# Patient Record
Sex: MALE | Race: WHITE | NOT HISPANIC OR LATINO | Employment: OTHER | URBAN - METROPOLITAN AREA
[De-identification: names, ages, dates, MRNs, and addresses within clinical notes are randomized per-mention and may not be internally consistent; named-entity substitution may affect disease eponyms.]

---

## 2023-11-02 ENCOUNTER — APPOINTMENT (OUTPATIENT)
Dept: OTOLARYNGOLOGY | Facility: CLINIC | Age: 53
End: 2023-11-02
Payer: MEDICARE

## 2024-02-14 ENCOUNTER — HOSPITAL ENCOUNTER (OUTPATIENT)
Dept: RADIOLOGY | Facility: HOSPITAL | Age: 54
Discharge: HOME | End: 2024-02-14
Payer: MEDICARE

## 2024-02-14 PROCEDURE — 93971 EXTREMITY STUDY: CPT

## 2024-03-08 ENCOUNTER — APPOINTMENT (OUTPATIENT)
Dept: RADIOLOGY | Facility: HOSPITAL | Age: 54
End: 2024-03-08
Payer: MEDICARE

## 2024-03-18 ENCOUNTER — APPOINTMENT (OUTPATIENT)
Dept: RADIOLOGY | Facility: HOSPITAL | Age: 54
End: 2024-03-18
Payer: MEDICARE

## 2024-05-03 ENCOUNTER — APPOINTMENT (OUTPATIENT)
Dept: RADIOLOGY | Facility: HOSPITAL | Age: 54
End: 2024-05-03
Payer: MEDICARE

## 2024-05-10 ENCOUNTER — APPOINTMENT (OUTPATIENT)
Dept: RADIOLOGY | Facility: HOSPITAL | Age: 54
End: 2024-05-10
Payer: MEDICARE

## 2024-05-10 ENCOUNTER — HOSPITAL ENCOUNTER (OUTPATIENT)
Dept: RADIOLOGY | Facility: HOSPITAL | Age: 54
Discharge: HOME | End: 2024-05-10
Payer: MEDICARE

## 2024-05-10 DIAGNOSIS — M25.512 PAIN IN LEFT SHOULDER: ICD-10-CM

## 2024-05-10 PROCEDURE — 73221 MRI JOINT UPR EXTREM W/O DYE: CPT | Mod: LT

## 2024-05-10 PROCEDURE — 73221 MRI JOINT UPR EXTREM W/O DYE: CPT | Mod: LEFT SIDE | Performed by: RADIOLOGY

## 2024-07-30 DIAGNOSIS — N48.6 PEYRONIE DISEASE: ICD-10-CM

## 2024-07-31 ENCOUNTER — CLINICAL SUPPORT (OUTPATIENT)
Dept: AUDIOLOGY | Facility: CLINIC | Age: 54
End: 2024-07-31
Payer: MEDICARE

## 2024-07-31 ENCOUNTER — SPECIALTY PHARMACY (OUTPATIENT)
Dept: PHARMACY | Facility: CLINIC | Age: 54
End: 2024-07-31

## 2024-07-31 DIAGNOSIS — H90.3 SENSORINEURAL HEARING LOSS (SNHL) OF BOTH EARS: Primary | ICD-10-CM

## 2024-07-31 PROCEDURE — HRANC PR HEARING AID NO CHARGE: Performed by: AUDIOLOGIST

## 2024-07-31 NOTE — PROGRESS NOTES
HEARING AID CHECK    RIGHT:PHONAK DARONEO  M 30 R #1 LENGTH M  AND MEDIUM VENTED DOME SN: [id number]  LEFT:PHONAK AUDEO  M 30 R #1 LENGTH M  AND MEDIUM VENTED DOME SN: [id number]  FIT DATE: 2021  WARRANTY: 10/10/2024    This patient was seen for a HAC with the complaint that  right aid not working.  Right  replaced but aid still dead.   It is under warranty and will be sent out.  Left aid fine today and did a quick clean and check before sending aid out for repair.       The following programming changes were made: NONE    The patient paid  NO charge  for today's visit.  Return when aid comes back from a no charge repair or sooner if needed.    APPOINTMENT TIME:  30 minutes

## 2024-08-09 ENCOUNTER — DOCUMENTATION (OUTPATIENT)
Dept: AUDIOLOGY | Facility: CLINIC | Age: 54
End: 2024-08-09
Payer: MEDICARE

## 2024-08-09 NOTE — PROGRESS NOTES
The patient's right hearing aid is back from repair.  Called the patient to pick it up in [place].  He will drop off his left aid to be sent in at that time.  N/C

## 2024-08-14 DIAGNOSIS — N52.9 ERECTILE DYSFUNCTION, UNSPECIFIED ERECTILE DYSFUNCTION TYPE: Primary | ICD-10-CM

## 2024-08-14 RX ORDER — ALPROSTADIL 20 UG/ML
20 INJECTION, POWDER, LYOPHILIZED, FOR SOLUTION INTRACAVERNOUS ONCE AS NEEDED
Qty: 20 MCG | Refills: 3 | Status: SHIPPED | OUTPATIENT
Start: 2024-08-14

## 2024-08-19 ENCOUNTER — DOCUMENTATION (OUTPATIENT)
Dept: AUDIOLOGY | Facility: CLINIC | Age: 54
End: 2024-08-19
Payer: MEDICARE

## 2024-08-19 NOTE — PROGRESS NOTES
Mr. Pereyra dropped off left hearing aid to be sent out for end of warranty repair. Hearing aid will be picked up in [place] when it returns from repair.

## 2024-08-29 ENCOUNTER — DOCUMENTATION (OUTPATIENT)
Dept: UROLOGY | Facility: CLINIC | Age: 54
End: 2024-08-29
Payer: MEDICARE

## 2024-08-29 NOTE — PROGRESS NOTES
He has a history of Peyronie's disease.  He has a 40 degree curvature.  He has a 1x2 cm palpable plaque dorsally.  He developed symptoms in 12/2023.  Xiaflex approval has been submitted.

## 2024-09-10 ENCOUNTER — APPOINTMENT (OUTPATIENT)
Dept: UROLOGY | Facility: CLINIC | Age: 54
End: 2024-09-10
Payer: MEDICARE

## 2024-09-11 DIAGNOSIS — N52.9 ERECTILE DYSFUNCTION, UNSPECIFIED ERECTILE DYSFUNCTION TYPE: ICD-10-CM

## 2024-09-11 RX ORDER — ALPROSTADIL 20 UG/ML
20 INJECTION, POWDER, LYOPHILIZED, FOR SOLUTION INTRACAVERNOUS ONCE AS NEEDED
Qty: 20 MCG | Refills: 1 | Status: SHIPPED | OUTPATIENT
Start: 2024-09-11

## 2024-09-12 ENCOUNTER — APPOINTMENT (OUTPATIENT)
Dept: UROLOGY | Facility: CLINIC | Age: 54
End: 2024-09-12
Payer: MEDICARE

## 2024-09-16 ENCOUNTER — APPOINTMENT (OUTPATIENT)
Dept: UROLOGY | Facility: CLINIC | Age: 54
End: 2024-09-16
Payer: MEDICARE

## 2024-10-01 ENCOUNTER — APPOINTMENT (OUTPATIENT)
Dept: UROLOGY | Facility: CLINIC | Age: 54
End: 2024-10-01
Payer: MEDICARE

## 2024-10-03 ENCOUNTER — APPOINTMENT (OUTPATIENT)
Dept: UROLOGY | Facility: CLINIC | Age: 54
End: 2024-10-03
Payer: MEDICARE

## 2024-10-07 ENCOUNTER — APPOINTMENT (OUTPATIENT)
Dept: UROLOGY | Facility: CLINIC | Age: 54
End: 2024-10-07
Payer: MEDICARE

## 2024-10-22 ENCOUNTER — APPOINTMENT (OUTPATIENT)
Dept: UROLOGY | Facility: CLINIC | Age: 54
End: 2024-10-22
Payer: MEDICARE

## 2024-10-22 DIAGNOSIS — N48.6 PEYRONIE'S DISEASE: ICD-10-CM

## 2024-10-22 RX ORDER — HALOPERIDOL 5 MG/1
5 TABLET ORAL 4 TIMES DAILY
COMMUNITY

## 2024-10-22 RX ORDER — BUPROPION HYDROCHLORIDE 300 MG/1
300 TABLET ORAL DAILY
COMMUNITY

## 2024-10-22 RX ORDER — FENOFIBRATE 160 MG/1
160 TABLET ORAL DAILY
COMMUNITY

## 2024-10-22 RX ORDER — CARBAMAZEPINE 200 MG/1
200 TABLET ORAL 3 TIMES DAILY
COMMUNITY

## 2024-10-22 RX ORDER — METOPROLOL SUCCINATE 25 MG/1
25 TABLET, EXTENDED RELEASE ORAL DAILY
COMMUNITY

## 2024-10-22 RX ORDER — PRAVASTATIN SODIUM 80 MG/1
80 TABLET ORAL DAILY
COMMUNITY

## 2024-10-22 RX ORDER — BENZTROPINE MESYLATE 2 MG/1
2 TABLET ORAL 2 TIMES DAILY
COMMUNITY

## 2024-10-22 ASSESSMENT — PAIN SCALES - GENERAL: PAINLEVEL_OUTOF10: 0-NO PAIN

## 2024-10-23 ENCOUNTER — TELEPHONE (OUTPATIENT)
Dept: UROLOGY | Facility: CLINIC | Age: 54
End: 2024-10-23
Payer: MEDICARE

## 2024-10-23 NOTE — TELEPHONE ENCOUNTER
Called patient in response to his voicemail message. Patient states he woke up with swelling and bruising in penis where Xiaflex injections were administered yesterday in-office.  Patient reports pinkish bruise with a few bluish-purple areas on outline of bruise, denies pain, denies any popping sound or sensation, denies difficulty urinating or blood in urine.  Discussed s/s of infection, and hematoma and when to present to the emergency room. Patient verbalized understanding and all questions were answered. Patient will follow-up with urologist 10/24/24 in-office for evaluation and second injection dose.

## 2024-10-24 ENCOUNTER — APPOINTMENT (OUTPATIENT)
Dept: UROLOGY | Facility: CLINIC | Age: 54
End: 2024-10-24
Payer: MEDICARE

## 2024-10-24 VITALS — WEIGHT: 257 LBS | HEIGHT: 68 IN | BODY MASS INDEX: 38.95 KG/M2

## 2024-10-24 DIAGNOSIS — N48.6 PEYRONIE'S DISEASE: ICD-10-CM

## 2024-10-24 ASSESSMENT — PAIN SCALES - GENERAL: PAINLEVEL_OUTOF10: 0-NO PAIN

## 2024-10-28 ENCOUNTER — APPOINTMENT (OUTPATIENT)
Dept: UROLOGY | Facility: CLINIC | Age: 54
End: 2024-10-28
Payer: MEDICARE

## 2024-10-28 DIAGNOSIS — N48.6 PEYRONIE'S DISEASE: Primary | ICD-10-CM

## 2024-10-28 PROCEDURE — G2211 COMPLEX E/M VISIT ADD ON: HCPCS | Performed by: UROLOGY

## 2024-10-28 PROCEDURE — 1036F TOBACCO NON-USER: CPT | Performed by: UROLOGY

## 2024-10-28 PROCEDURE — 99213 OFFICE O/P EST LOW 20 MIN: CPT | Performed by: UROLOGY

## 2024-10-28 ASSESSMENT — PAIN SCALES - GENERAL: PAINLEVEL_OUTOF10: 0-NO PAIN

## 2024-12-11 NOTE — PROGRESS NOTES
"  Patient is a 54 y.o. male presenting with Peyronie's disease    SUBJECTIVE:  HPI    He has history of Peyronie disease. His last erection was 3 days ago. He reports a curvature in a U shape. He is continuing stretching. He reports that he will undergo a shoulder procedure on , and he is confident that he can consider stretching.    No results found for: \"URINECULTURE\"     No past medical history on file.   No past surgical history on file.   No family history on file.   Social History     Socioeconomic History    Marital status:    Tobacco Use    Smoking status: Never    Smokeless tobacco: Never     Social Drivers of Health     Financial Resource Strain: Not on File (2021)    [facility] from [facility]    Financial Resource Strain     Financial Resource Strain: 0   Food Insecurity: Not on File (2024)    Received from [facility]    Food Insecurity     Food: 0   Transportation Needs: Not on File (2021)    [facility]    Transportation Needs     Transportation: 0   Physical Activity: Not on File (2021)    [facility] from [facility]    Physical Activity     Physical Activity: 0   Stress: Not on File (2021)    Received from [facility]    Stress     Stress: 0   Social Connections: Not on File (2024)    Received from [facility]    Social Connections     Connectedness: 0   Housing Stability: Not on File (2021)    [facility]    Housing Stability     [place]ous[place]      OBJECTIVE:  Visit Vitals  Temp 35.7 °C (96.2 °F)     Physical Exam   Constitutional: No obvious distress.  Eyes: Non-injected conjunctiva, sclera clear, EOMI.  Ears/Nose/Mouth/Throat: No obvious drainage per ears or nose.  Cardiovascular: Extremities are warm and well perfused. No edema, cyanosis or pallor.  Respiratory: No audible wheezing/stridor; respirations do not appear labored.  Gastrointestinal: Abdomen soft, not distended.  Musculoskeletal: Normal ROM of extremities.  Skin: " "No obvious rashes or open sores.  Neurologic: Alert and oriented, CN 2-12 grossly intact.  Psychiatric: Answers questions appropriately with normal affect.  Hematologic/Lymphatic/Immunologic: No obvious bruises or sites of spontaneous bleeding.  Genitourinary: No CVA tenderness, bladder not palpable. Palpable plaque at mid to distal dorsal penis that measures 1.5cm x 0.5cm    Labs:  Lab Results   Component Value Date    HGBA1C 5.3 05/09/2023     No results found for: \"KPSAT\", \"KPSAP\"  IMAGING:      PROCEDURES:    ASSESSMENT/PLAN:  Problem List Items Addressed This Visit       Peyronie's disease - Primary          He has a history of Peyronie's disease.  He has a 40 degree curvature.  He has a 1x2 cm palpable plaque dorsally.  He developed symptoms in 12/2023.       He will perform stretching exercises daily. We discussed vacuum pump therapy for stretching while recovering from his shoulder surgery.   He will follow up in January 2025 Edex, phenylephrine, Xiaflex    All questions were answered to the patient’s satisfaction.  Patient agrees with the plan and wishes to proceed.  Follow-up will be scheduled appropriately.     Scribe Attestation  By signing my name below, IJeana Scribe,   attest that this documentation has been prepared under the direction and in the presence of Luis Antonio Armstrong MD.     "

## 2024-12-12 ENCOUNTER — APPOINTMENT (OUTPATIENT)
Dept: UROLOGY | Facility: CLINIC | Age: 54
End: 2024-12-12
Payer: MEDICARE

## 2024-12-12 VITALS — BODY MASS INDEX: 39.28 KG/M2 | TEMPERATURE: 96.2 F | HEIGHT: 68 IN | WEIGHT: 259.2 LBS

## 2024-12-12 DIAGNOSIS — N48.6 PEYRONIE'S DISEASE: Primary | ICD-10-CM

## 2024-12-12 PROCEDURE — 3008F BODY MASS INDEX DOCD: CPT | Performed by: UROLOGY

## 2024-12-12 PROCEDURE — G2211 COMPLEX E/M VISIT ADD ON: HCPCS | Performed by: UROLOGY

## 2024-12-12 PROCEDURE — 99213 OFFICE O/P EST LOW 20 MIN: CPT | Performed by: UROLOGY

## 2024-12-12 ASSESSMENT — PAIN SCALES - GENERAL: PAINLEVEL_OUTOF10: 0-NO PAIN

## 2025-01-18 NOTE — PROGRESS NOTES
Patient is a 55 y.o. male presenting with Peyronie's disease    SUBJECTIVE:  HPI   He presents today for Edex, phenylephrine, and Xiaflex injection today (01/21/25).    He has history of Peyronie disease. He reports a curvature in a U shape. He is continuing stretching. He had recent shoulder surgery on December 17, 2024.    No past medical history on file.  No past surgical history on file.   No family history on file.   Tobacco Use: Low Risk  (12/12/2024)    Received from [facility]    Patient History     Smoking Tobacco Use: Never     Smokeless Tobacco Use: Never     Passive Exposure: Not on file     Review of Systems   Constitutional: denies any unintentional weight loss or change in strength.  Integumentary: denies any rashes or pruritus.  Eyes: denies any double vision or eye pain.  Ear/Nose/Mouth/Throat: denies any nosebleeds or gum bleeds.  Cardiovascular: denies any chest pain or syncope.  Respiratory: denies hemoptysis.  Gastrointestinal: denies nausea or vomiting.  Musculoskeletal: denies muscle cramping or weakness.  Neurologic: denies convulsions or seizures.  Hematologic/Lymphatic: denies bleeding tendencies.  Endocrine: denies heat/cold intolerance.  All other systems have been reviewed and are negative unless otherwise noted in the HPI.    OBJECTIVE:  There were no vitals taken for this visit.  Physical Exam   Constitutional: No obvious distress.  Eyes: Non-injected conjunctiva, sclera clear, EOMI.  Ears/Nose/Mouth/Throat: No obvious drainage per ears or nose.  Cardiovascular: Extremities are warm and well perfused. No edema, cyanosis or pallor.  Respiratory: No audible wheezing/stridor; respirations do not appear labored.  Gastrointestinal: Abdomen soft, not distended.  Musculoskeletal: Normal ROM of extremities.  Skin: No obvious rashes or open sores.  Neurologic: Alert and oriented, CN 2-12 grossly intact.  Psychiatric: Answers questions appropriately with normal  "affect.  Hematologic/Lymphatic/Immunologic: No obvious bruises or sites of spontaneous bleeding.  Genitourinary: No CVA tenderness, bladder not palpable.     Current Outpatient Medications   Medication Instructions    benztropine (COGENTIN) 2 mg, 2 times daily    buPROPion XL (WELLBUTRIN XL) 300 mg, Daily    carBAMazepine (TEGRETOL) 200 mg, 3 times daily    collagenase clostridium histolyticum (Xiaflex) injection For provider to inject 0.58mg intracavernosal. Repeat 2-3 days later.    Edex 20 mcg, intracavernosal, Once as needed    fenofibrate (TRIGLIDE) 160 mg, Daily    haloperidol (HALDOL) 5 mg, 4 times daily    metoprolol succinate XL (TOPROL-XL) 25 mg, Daily    pravastatin (PRAVACHOL) 80 mg, Daily     No Known Allergies    Labs:  No results found for: \"GLUCOSE\", \"CALCIUM\", \"NA\", \"K\", \"CO2\", \"CL\", \"BUN\", \"CREATININE\"  No results found for: \"URICACID\"  No results found for: \"PTH\"  No results found for: \"TESTOSTERONE\"    PSA  05/09/2023               0.21    No results found for: \"URINECULTURE\"   IMAGING:      PROCEDURES:  Injections of Edex, phenylephrine and Xiaflex were given (01/21/25)     ASSESSMENT/PLAN:  Problem List Items Addressed This Visit       Peyronie's disease - Primary        He has a history of Peyronie's disease.  He has a 40 degree curvature.  He has a 1x2 cm palpable plaque dorsally.  He developed symptoms in 12/2023.         He had injection of Edex, phenylephrine, Xiaflex today (01/21/25). He will follow up January 23, 2025 for his next Xiaflex injection.    All questions were answered to the patient’s satisfaction.  Patient agrees with the plan and wishes to proceed.  Follow-up will be scheduled appropriately.     Scribe Attestation  By signing my name below, Jeana MENA Scribe,   attest that this documentation has been prepared under the direction and in the presence of Luis Antonio Armstrong MD.  "

## 2025-01-21 ENCOUNTER — APPOINTMENT (OUTPATIENT)
Dept: UROLOGY | Facility: CLINIC | Age: 55
End: 2025-01-21
Payer: MEDICARE

## 2025-01-21 DIAGNOSIS — N48.6 PEYRONIE'S DISEASE: Primary | ICD-10-CM

## 2025-01-21 PROCEDURE — 54200 INJECTION PX PEYRONIE DS: CPT | Performed by: UROLOGY

## 2025-01-21 PROCEDURE — 54235 NJX CORPORA CAVERNOSA RX AGT: CPT | Performed by: UROLOGY

## 2025-01-21 ASSESSMENT — PAIN SCALES - GENERAL: PAINLEVEL_OUTOF10: 0-NO PAIN

## 2025-01-22 NOTE — PROGRESS NOTES
Patient is a 55 y.o. male presenting with Peyronie's disease.    SUBJECTIVE:  HPI   He presents today for his second Xiaflex injection today (01/23/25)     He has history of Peyronie disease. He reports a curvature in a U shape. He is continuing stretching. He had recent shoulder surgery on December 17, 2024.    No past medical history on file.  No past surgical history on file.   No family history on file.   Tobacco Use: Low Risk  (12/12/2024)    Received from [facility]    Patient History     Smoking Tobacco Use: Never     Smokeless Tobacco Use: Never     Passive Exposure: Not on file       Review of Systems   Constitutional: denies any unintentional weight loss or change in strength.  Integumentary: denies any rashes or pruritus.  Eyes: denies any double vision or eye pain.  Ear/Nose/Mouth/Throat: denies any nosebleeds or gum bleeds.  Cardiovascular: denies any chest pain or syncope.  Respiratory: denies hemoptysis.  Gastrointestinal: denies nausea or vomiting.  Musculoskeletal: denies muscle cramping or weakness.  Neurologic: denies convulsions or seizures.  Hematologic/Lymphatic: denies bleeding tendencies.  Endocrine: denies heat/cold intolerance.  All other systems have been reviewed and are negative unless otherwise noted in the HPI.    OBJECTIVE:  Visit Vitals  Temp 35.4 °C (95.7 °F)     Physical Exam   Constitutional: No obvious distress.  Eyes: Non-injected conjunctiva, sclera clear, EOMI.  Ears/Nose/Mouth/Throat: No obvious drainage per ears or nose.  Cardiovascular: Extremities are warm and well perfused. No edema, cyanosis or pallor.  Respiratory: No audible wheezing/stridor; respirations do not appear labored.  Gastrointestinal: Abdomen soft, not distended.  Musculoskeletal: Normal ROM of extremities.  Skin: No obvious rashes or open sores.  Neurologic: Alert and oriented, CN 2-12 grossly intact.  Psychiatric: Answers questions appropriately with normal  "affect.  Hematologic/Lymphatic/Immunologic: No obvious bruises or sites of spontaneous bleeding.  Genitourinary: No CVA tenderness, bladder not palpable.     Current Outpatient Medications   Medication Instructions    benztropine (COGENTIN) 2 mg, 2 times daily    buPROPion XL (WELLBUTRIN XL) 300 mg, Daily    carBAMazepine (TEGRETOL) 200 mg, 3 times daily    collagenase clostridium histolyticum (Xiaflex) injection For provider to inject 0.58mg intracavernosal. Repeat 2-3 days later.    Edex 20 mcg, intracavernosal, Once as needed    fenofibrate (TRIGLIDE) 160 mg, Daily    haloperidol (HALDOL) 5 mg, 4 times daily    metoprolol succinate XL (TOPROL-XL) 25 mg, Daily    pravastatin (PRAVACHOL) 80 mg, Daily     No Known Allergies  Labs:  No results found for: \"GLUCOSE\", \"CALCIUM\", \"NA\", \"K\", \"CO2\", \"CL\", \"BUN\", \"CREATININE\"  No results found for: \"URICACID\"  No results found for: \"PTH\"  No results found for: \"TESTOSTERONE\"  PSA  05/09/2023               0.21    No results found for: \"URINECULTURE\"   IMAGING:      PROCEDURES:  He received his second injection of Xiaflex (01/23/25)     ASSESSMENT/PLAN:  Problem List Items Addressed This Visit       Peyronie's disease - Primary        He has a history of Peyronie's disease.  He has a 40 degree curvature.  He has a 1x2 cm palpable plaque dorsally.  He developed symptoms in 12/2023.         He had his second injection of Xiaflex today (01/23/25). He will wait 3-4 days and then continue stretching therapy. He will follow up in 6 weeks.     All questions were answered to the patient’s satisfaction.  Patient agrees with the plan and wishes to proceed.  Follow-up will be scheduled appropriately.     Scribe Attestation  By signing my name below, Jeana MENA Scribe,   attest that this documentation has been prepared under the direction and in the presence of Luis Antonio Armstrong MD.  "

## 2025-01-23 ENCOUNTER — APPOINTMENT (OUTPATIENT)
Dept: UROLOGY | Facility: CLINIC | Age: 55
End: 2025-01-23
Payer: MEDICARE

## 2025-01-23 VITALS — BODY MASS INDEX: 38.8 KG/M2 | TEMPERATURE: 95.7 F | HEIGHT: 68 IN | WEIGHT: 256 LBS

## 2025-01-23 DIAGNOSIS — N48.6 PEYRONIE'S DISEASE: Primary | ICD-10-CM

## 2025-01-23 ASSESSMENT — PAIN SCALES - GENERAL: PAINLEVEL_OUTOF10: 0-NO PAIN

## 2025-01-27 ENCOUNTER — APPOINTMENT (OUTPATIENT)
Dept: UROLOGY | Facility: CLINIC | Age: 55
End: 2025-01-27
Payer: MEDICARE

## 2025-02-26 DIAGNOSIS — N48.6 PEYRONIE DISEASE: ICD-10-CM

## 2025-02-26 DIAGNOSIS — N52.9 ERECTILE DYSFUNCTION, UNSPECIFIED ERECTILE DYSFUNCTION TYPE: ICD-10-CM

## 2025-02-26 RX ORDER — ALPROSTADIL 20 UG/ML
20 INJECTION, POWDER, LYOPHILIZED, FOR SOLUTION INTRACAVERNOUS ONCE AS NEEDED
Qty: 20 MCG | Refills: 0 | Status: SHIPPED | OUTPATIENT
Start: 2025-02-26

## 2025-03-09 NOTE — PROGRESS NOTES
"  Patient is a 55 y.o. male presenting with Peyronie's disease.    SUBJECTIVE:  HPI   He presents for his third cycle, first injection of Xiaflex.  He has history of Peyronie disease. He reports a curvature in a U shape. He is continuing stretching. He had recent shoulder surgery on December 17, 2024.    History reviewed. No pertinent past medical history.  History reviewed. No pertinent surgical history.   No family history on file.     Review of Systems     OBJECTIVE:  There were no vitals taken for this visit.    Physical Exam   Constitutional: No obvious distress.  Eyes: Non-injected conjunctiva, sclera clear, EOMI.  Ears/Nose/Mouth/Throat: No obvious drainage per ears or nose.  Cardiovascular: Extremities are warm and well perfused. No edema, cyanosis or pallor.  Respiratory: No audible wheezing/stridor; respirations do not appear labored.  Gastrointestinal: Abdomen soft, not distended.  Musculoskeletal: Normal ROM of extremities.  Skin: No obvious rashes or open sores.  Neurologic: Alert and oriented, CN 2-12 grossly intact.  Psychiatric: Answers questions appropriately with normal affect.  Hematologic/Lymphatic/Immunologic: No obvious bruises or sites of spontaneous bleeding.  Genitourinary: No CVA tenderness, bladder not palpable.       Labs:  No results found for: \"GLUCOSE\", \"CALCIUM\", \"NA\", \"K\", \"CO2\", \"CL\", \"BUN\", \"CREATININE\"  No results found for: \"URICACID\"  No results found for: \"PTH\"  No results found for: \"TESTOSTERONE\"  PSA  05/09/2023               0.21    No results found for: \"URINECULTURE\"   IMAGING:      PROCEDURES:  Xiaflex injected into the plaque.  Edex injected into the penis, followed by phenylephrine   He had a 40 degree angulation present    ASSESSMENT/PLAN:  Problem List Items Addressed This Visit    None      He has history of Peyronie's disease. He has a 40 degree curvature. He has a 1.2 cm palpable plaque dorsally. He developed symptoms in 12/23. We discussed other potential " surgical treatment options if he does not have improvement with Xiaflex.     His second series of Xiaflex was completed on 1/23/25. He received his first injection of his third cycle of Xiaflex today (3/11/25).    All questions were answered to the patient’s satisfaction.  Patient agrees with the plan and wishes to proceed.  Follow-up will be scheduled appropriately.     Scribe Attestation  By signing my name below, I, Patience Urias,   attest that this documentation has been prepared under the direction and in the presence of Luis Antonio Armstrong MD.

## 2025-03-11 ENCOUNTER — APPOINTMENT (OUTPATIENT)
Dept: UROLOGY | Facility: CLINIC | Age: 55
End: 2025-03-11
Payer: MEDICARE

## 2025-03-11 DIAGNOSIS — N48.6 PEYRONIE'S DISEASE: ICD-10-CM

## 2025-03-11 PROCEDURE — 54200 INJECTION PX PEYRONIE DS: CPT | Performed by: UROLOGY

## 2025-03-11 PROCEDURE — 54235 NJX CORPORA CAVERNOSA RX AGT: CPT | Performed by: UROLOGY

## 2025-03-11 PROCEDURE — 1036F TOBACCO NON-USER: CPT | Performed by: UROLOGY

## 2025-03-12 NOTE — PROGRESS NOTES
"  Patient is a 55 y.o. male presenting with Peyronie's disease.    SUBJECTIVE:  HPI   He presents for his third cycle, second injection of Xiaflex.  He has history of Peyronie disease. He reports a curvature in a U shape.     No past medical history on file.  No past surgical history on file.   No family history on file.     Review of Systems     OBJECTIVE:  There were no vitals taken for this visit.    Physical Exam   Constitutional: No obvious distress.  Eyes: Non-injected conjunctiva, sclera clear, EOMI.  Ears/Nose/Mouth/Throat: No obvious drainage per ears or nose.  Cardiovascular: Extremities are warm and well perfused. No edema, cyanosis or pallor.  Respiratory: No audible wheezing/stridor; respirations do not appear labored.  Gastrointestinal: Abdomen soft, not distended.  Musculoskeletal: Normal ROM of extremities.  Skin: No obvious rashes or open sores.  Neurologic: Alert and oriented, CN 2-12 grossly intact.  Psychiatric: Answers questions appropriately with normal affect.  Hematologic/Lymphatic/Immunologic: No obvious bruises or sites of spontaneous bleeding.  Genitourinary: No CVA tenderness, bladder not palpable.       Labs:  No results found for: \"GLUCOSE\", \"CALCIUM\", \"NA\", \"K\", \"CO2\", \"CL\", \"BUN\", \"CREATININE\"  No results found for: \"URICACID\"  No results found for: \"PTH\"  No results found for: \"TESTOSTERONE\"  PSA  05/09/2023               0.21    No results found for: \"URINECULTURE\"   IMAGING:      PROCEDURES:  Xiaflex injected into the plaque.  Edex injected into the penis, followed by phenylephrine   He had a 40 degree angulation present    ASSESSMENT/PLAN:  Problem List Items Addressed This Visit       Peyronie's disease    Relevant Medications    collagenase clostridium histolyticum (Xiaflex) injection 0.58 mg       He has history of Peyronie's disease. He has a 40 degree curvature. He has a 1.2 cm palpable plaque dorsally. He developed symptoms in 12/23. We discussed other potential surgical " treatment options if he does not have improvement with Xiaflex.     His second series of Xiaflex was completed on 1/23/25. He received his second injection of his third cycle of Xiaflex today (3/13/25). He will follow up Monday, March 17, 2025.     All questions were answered to the patient’s satisfaction.  Patient agrees with the plan and wishes to proceed.  Follow-up will be scheduled appropriately.     Scribe Attestation  By signing my name below, I, Patience Urias,   attest that this documentation has been prepared under the direction and in the presence of Luis Antonio Armstrong MD.

## 2025-03-13 ENCOUNTER — APPOINTMENT (OUTPATIENT)
Dept: UROLOGY | Facility: CLINIC | Age: 55
End: 2025-03-13
Payer: MEDICARE

## 2025-03-13 DIAGNOSIS — N48.6 PEYRONIE'S DISEASE: ICD-10-CM

## 2025-03-13 PROCEDURE — 54200 INJECTION PX PEYRONIE DS: CPT | Performed by: UROLOGY

## 2025-03-15 NOTE — PROGRESS NOTES
"  Patient is a 55 y.o. male presenting with Peyronie's disease.    SUBJECTIVE:  HPI   He presents for follow up status post third cycle of Xiaflex 3/13/25.  He has some bruising at the injection site.  He has history of Peyronie disease. He reports a curvature in a U shape.     PMH: Peyronie's Disease    Review of Systems     OBJECTIVE:  There were no vitals taken for this visit.    Physical Exam   Penile bruising.  Non tender    Labs:    PSA  05/09/2023               0.21    No results found for: \"URINECULTURE\"   IMAGING:      PROCEDURES:    ASSESSMENT/PLAN:  Problem List Items Addressed This Visit    None    He has history of Peyronie's disease. He has a 40 degree curvature. He has a 1.2 cm palpable plaque dorsally. He developed symptoms in 12/23. We discussed other potential surgical treatment options if he does not have improvement with Xiaflex.     His second series of Xiaflex was completed on 1/23/25. He received his second injection of his third cycle of Xiaflex 3/13/25.     He will continue stretching therapy. He will follow up in 6 weeks for series 4 of Xiaflex if needed.    All questions were answered to the patient’s satisfaction.  Patient agrees with the plan and wishes to proceed.  Follow-up will be scheduled appropriately.     Thiibe Attestation  By signing my name below, I, Patience Urias,   attest that this documentation has been prepared under the direction and in the presence of Luis Antonio Armstrong MD.  "

## 2025-03-17 ENCOUNTER — APPOINTMENT (OUTPATIENT)
Dept: UROLOGY | Facility: CLINIC | Age: 55
End: 2025-03-17
Payer: MEDICARE

## 2025-03-17 DIAGNOSIS — N48.6 PEYRONIE'S DISEASE: Primary | ICD-10-CM

## 2025-03-17 PROCEDURE — 1036F TOBACCO NON-USER: CPT | Performed by: UROLOGY

## 2025-03-17 PROCEDURE — 99024 POSTOP FOLLOW-UP VISIT: CPT | Performed by: UROLOGY

## 2025-03-17 ASSESSMENT — PAIN SCALES - GENERAL: PAINLEVEL_OUTOF10: 3

## 2025-04-22 ENCOUNTER — APPOINTMENT (OUTPATIENT)
Dept: UROLOGY | Facility: CLINIC | Age: 55
End: 2025-04-22
Payer: MEDICARE

## 2025-04-22 DIAGNOSIS — N48.6 PEYRONIE'S DISEASE: ICD-10-CM

## 2025-04-22 PROCEDURE — 54200 INJECTION PX PEYRONIE DS: CPT | Performed by: UROLOGY

## 2025-04-22 PROCEDURE — 54235 NJX CORPORA CAVERNOSA RX AGT: CPT | Performed by: UROLOGY

## 2025-04-22 ASSESSMENT — PAIN SCALES - GENERAL: PAINLEVEL_OUTOF10: 0-NO PAIN

## 2025-04-22 NOTE — PROGRESS NOTES
Patient is a 55 y.o. male presenting with Peyronie's disease.    SUBJECTIVE:  HPI   He presents for cycle 4, first injection of Xiaflex.  He reports decreased erections.  He does report a decrease in the angulation of his penis during an erection.    He has history of Peyronie disease. He reports a curvature in a U shape.   He reports some improvement of his Peyronies    PMH: Peyronie's Disease    Medical History[1]  Surgical History[2]    Review of Systems     OBJECTIVE:  There were no vitals taken for this visit.    Physical Exam   Constitutional: No obvious distress.  Eyes: Non-injected conjunctiva, sclera clear, EOMI.  Ears/Nose/Mouth/Throat: No obvious drainage per ears or nose.  Cardiovascular: Extremities are warm and well perfused. No edema, cyanosis or pallor.  Respiratory: No audible wheezing/stridor; respirations do not appear labored.  Gastrointestinal: Abdomen soft, not distended.  Musculoskeletal: Normal ROM of extremities.  Skin: No obvious rashes or open sores.  Neurologic: Alert and oriented, CN 2-12 grossly intact.  Psychiatric: Answers questions appropriately with normal affect.  Hematologic/Lymphatic/Immunologic: No obvious bruises or sites of spontaneous bleeding.  Genitourinary: No CVA tenderness, bladder not palpable.     Labs:  PSA  05/09/2023               0.21    IMAGING:  PROCEDURES:  Edex, followed by phenylephrine injected into the penis.  35 degree curvature dorsally distally.  Patient supplied their own Xiaflex today.    ASSESSMENT/PLAN:  Problem List Items Addressed This Visit       Peyronie's disease    Relevant Medications    collagenase clostridium histolyticum (Xiaflex) injection 0.58 mg (Completed)     He has history of Peyronie's disease. He has a 40 degree curvature. He has a 1.2 cm palpable plaque dorsally. He developed symptoms in 12/23.     We discussed other potential surgical treatment options if he does not have improvement with Xiaflex.     His second series of Xiaflex  was completed on 1/23/25. He received his second injection of his third cycle of Xiaflex 3/13/25.    He received cycle 4, injection 1 of Xiaflex today (4/22/25). He will follow up on 4/24/25 for his next injection.  Patient supplied their own Xiaflex today.  He will continue stretching therapy the following month.    He has erectile dysfunction. We discussed PDE5 inhibitors. He will let the office know if he would like a prescription.    All questions were answered to the patient’s satisfaction.  Patient agrees with the plan and wishes to proceed.  Follow-up will be scheduled appropriately.     Scribe Attestation  By signing my name below, I, Patience Urias,   attest that this documentation has been prepared under the direction and in the presence of Luis Antonio Armstrong MD.         [1] No past medical history on file.  [2] No past surgical history on file.

## 2025-04-23 NOTE — PROGRESS NOTES
Patient is a 55 y.o. male presenting with Peyronie's disease.    SUBJECTIVE:  HPI   He presents for cycle 4, second injection of Xiaflex.  He has history of Peyronie disease. He reports a curvature in a U shape.    He does report a decrease in the angulation of his penis during an erection, however, has had a decrease in erections.    PMH: Peyronie's Disease    Medical History[1]  Surgical History[2]    Review of Systems     OBJECTIVE:  There were no vitals taken for this visit.    Physical Exam   Constitutional: No obvious distress.  Eyes: Non-injected conjunctiva, sclera clear, EOMI.  Ears/Nose/Mouth/Throat: No obvious drainage per ears or nose.  Cardiovascular: Extremities are warm and well perfused. No edema, cyanosis or pallor.  Respiratory: No audible wheezing/stridor; respirations do not appear labored.  Gastrointestinal: Abdomen soft, not distended.  Musculoskeletal: Normal ROM of extremities.  Skin: No obvious rashes or open sores.  Neurologic: Alert and oriented, CN 2-12 grossly intact.  Psychiatric: Answers questions appropriately with normal affect.  Hematologic/Lymphatic/Immunologic: No obvious bruises or sites of spontaneous bleeding.  Genitourinary: No CVA tenderness, bladder not palpable.     Labs:  PSA  05/09/2023               0.21    IMAGING:  PROCEDURES:  Edex, followed by phenylephrine injected into the penis.  35 degree curvature dorsally distally.  Patient supplied their own Xiaflex today.    ASSESSMENT/PLAN:  Problem List Items Addressed This Visit       Peyronie's disease    Relevant Medications    collagenase clostridium histolyticum (Xiaflex) injection 0.58 mg (Completed)     He has history of Peyronie's disease. He has a 40 degree curvature. He has a 1.2 cm palpable plaque dorsally. He developed symptoms in 12/23.     His second series of Xiaflex was completed on 1/23/25 and the third cycle was completed on 3/13/25.  He received cycle 4, injection 2 of Xiaflex today 4/24/25.     Patient  supplied their own Xiaflex today.  He will continue stretching therapy the following month.    We previously discussed penile plication if he does not have improvement with Xiaflex.     He has erectile dysfunction. We discussed PDE5 inhibitors. He will let the office know if he would like a prescription.    All questions were answered to the patient’s satisfaction.  Patient agrees with the plan and wishes to proceed.  Follow-up will be scheduled appropriately.     Scribe Attestation  By signing my name below, I, Patience Urias,   attest that this documentation has been prepared under the direction and in the presence of Luis Antonio Armstrong MD.         [1] No past medical history on file.  [2] No past surgical history on file.

## 2025-04-24 ENCOUNTER — APPOINTMENT (OUTPATIENT)
Dept: UROLOGY | Facility: CLINIC | Age: 55
End: 2025-04-24
Payer: MEDICARE

## 2025-04-24 DIAGNOSIS — N48.6 PEYRONIE'S DISEASE: ICD-10-CM

## 2025-04-24 PROCEDURE — 54200 INJECTION PX PEYRONIE DS: CPT | Performed by: UROLOGY

## 2025-04-24 PROCEDURE — 1036F TOBACCO NON-USER: CPT | Performed by: UROLOGY

## 2025-04-28 ENCOUNTER — APPOINTMENT (OUTPATIENT)
Dept: UROLOGY | Facility: CLINIC | Age: 55
End: 2025-04-28
Payer: MEDICARE

## 2025-06-26 ENCOUNTER — APPOINTMENT (OUTPATIENT)
Dept: UROLOGY | Facility: CLINIC | Age: 55
End: 2025-06-26
Payer: MEDICARE